# Patient Record
Sex: MALE
[De-identification: names, ages, dates, MRNs, and addresses within clinical notes are randomized per-mention and may not be internally consistent; named-entity substitution may affect disease eponyms.]

---

## 2020-10-06 PROBLEM — Z00.00 ENCOUNTER FOR PREVENTIVE HEALTH EXAMINATION: Noted: 2020-10-06

## 2020-10-07 ENCOUNTER — APPOINTMENT (OUTPATIENT)
Dept: UROLOGY | Facility: CLINIC | Age: 25
End: 2020-10-07
Payer: MEDICAID

## 2020-10-07 VITALS
WEIGHT: 165 LBS | HEART RATE: 88 BPM | BODY MASS INDEX: 24.44 KG/M2 | HEIGHT: 69 IN | DIASTOLIC BLOOD PRESSURE: 76 MMHG | SYSTOLIC BLOOD PRESSURE: 115 MMHG | RESPIRATION RATE: 14 BRPM | TEMPERATURE: 98.2 F

## 2020-10-07 PROCEDURE — 99203 OFFICE O/P NEW LOW 30 MIN: CPT

## 2020-10-07 RX ORDER — CHLORHEXIDINE GLUCONATE 4 %
LIQUID (ML) TOPICAL
Refills: 0 | Status: ACTIVE | COMMUNITY
Start: 2020-10-07

## 2020-10-07 NOTE — HISTORY OF PRESENT ILLNESS
[Currently Experiencing ___] :  [unfilled] [None] : None [FreeTextEntry1] : With c/o gradually worsening  ED x 1 year, with difficulty obtaining and maintaining erections. States no spontaneous erections and when attempting sexual activity loses erection.  In a relationship, libido good. Has tried Sildenafil 25 mg prescribed by PCP, used last Wednesday, not effective. Also has been using  Sildenafil 45 mg, 2 chew tabs (bought offline "[employer]"), initially effective, last time used was 3 weeks ago, no effect. Was taking [employer] supplement Vitamin Vitality pack ( no Testosterone noted on label).\par

## 2020-10-07 NOTE — PHYSICAL EXAM
[General Appearance - Well Developed] : well developed [General Appearance - Well Nourished] : well nourished [Normal Appearance] : normal appearance [Well Groomed] : well groomed [General Appearance - In No Acute Distress] : no acute distress [Edema] : no peripheral edema [] : no respiratory distress [Respiration, Rhythm And Depth] : normal respiratory rhythm and effort [Exaggerated Use Of Accessory Muscles For Inspiration] : no accessory muscle use [Abdomen Soft] : soft [Abdomen Tenderness] : non-tender [Urethral Meatus] : meatus normal [Penis Abnormality] : normal uncircumcised penis [Scrotum] : the scrotum was normal [Epididymis] : the epididymides were normal [Testes Tenderness] : no tenderness of the testes [Testes Mass (___cm)] : there were no testicular masses [Normal Station and Gait] : the gait and station were normal for the patient's age [Skin Color & Pigmentation] : normal skin color and pigmentation [No Focal Deficits] : no focal deficits [Oriented To Time, Place, And Person] : oriented to person, place, and time [Affect] : the affect was normal [Mood] : the mood was normal [Not Anxious] : not anxious

## 2020-10-07 NOTE — REVIEW OF SYSTEMS
[Negative] : Heme/Lymph [see HPI] : see HPI [Poor quality erections] : Poor quality erections [No erections] : no erections

## 2020-10-09 LAB
CHOLEST SERPL-MCNC: 149 MG/DL
CHOLEST/HDLC SERPL: 3.6 RATIO
HDLC SERPL-MCNC: 42 MG/DL
LDLC SERPL CALC-MCNC: 94 MG/DL
TRIGL SERPL-MCNC: 67 MG/DL

## 2020-10-12 ENCOUNTER — TRANSCRIPTION ENCOUNTER (OUTPATIENT)
Age: 25
End: 2020-10-12

## 2020-10-15 LAB
TESTOST BND SERPL-MCNC: 18.5 PG/ML
TESTOST SERPL-MCNC: 485.5 NG/DL

## 2020-10-29 ENCOUNTER — APPOINTMENT (OUTPATIENT)
Age: 25
End: 2020-10-29
Payer: MEDICAID

## 2020-10-29 VITALS
RESPIRATION RATE: 17 BRPM | HEART RATE: 98 BPM | DIASTOLIC BLOOD PRESSURE: 73 MMHG | SYSTOLIC BLOOD PRESSURE: 118 MMHG | TEMPERATURE: 98 F

## 2020-10-29 VITALS — TEMPERATURE: 98 F

## 2020-10-29 PROCEDURE — 99215 OFFICE O/P EST HI 40 MIN: CPT

## 2020-10-29 PROCEDURE — 99072 ADDL SUPL MATRL&STAF TM PHE: CPT

## 2020-10-29 NOTE — HISTORY OF PRESENT ILLNESS
[FreeTextEntry1] : Patient is a 25-year-old gentleman who presents with a chief complaint of erectile dysfunction. He states that this has been present for the past 3 years. It causes both he and his partner great stress.  I reviewed the questionnaire he completed in detail.  Patient states he no longer gets morning erections and has difficulty obtaining and maintaining an erection with and without use of 50 mg of sildenafil.   He states that his erections presently are often less than 0 out of 10 in both tumescence and rigidity, insufficient for penetration.   He often ejaculates through a flaccid phallus.  He has difficulty maintaining an erection. He describes a normal libido.  His sexual dysfunction occurs with both sexual relations and masturbation.  His erections are not improved with PDE5 inhibitors.    His partner is understanding and was unable to be with him at the visit today. He is not .\par \par The patient denies fevers, chills, nausea and/or vomiting and no unexplained weight loss.  His past medical history is non-contributory.  In his present occupation he has no known toxin exposure. He does not smoke and drinks socially.  He has no known drug allergies. His review of systems and past medical history demonstrates no significant urologic issues (see patient completed questionnaire). His family history demonstrates no significant urologic issues. He has an AUA symptom score of. He has a sexual function symptom score of (normal is 60-75).

## 2020-10-29 NOTE — ASSESSMENT
[FreeTextEntry1] : This pleasant 25 gentleman presents for evaluation of his sexual dysfunction.  I have requested several blood studies.  Urine dip and microscopic analysis [was requested].  I will make further recommendations when the results return. I have suggested a diagnostic injection and duplex ultrasound to evaluate his penile vasculature. We discussed his evaluation today and possible options for therapy depending upon the results of his testing.  I will be better able to make more specific recommendations after additional test results return. \par 1. Review blood tests on follow up or when they return\par 2. Penile duplex study on follow up\par \par Consultation: 40 minutes:  20 minutes reviewing his history and performing a physical examination.  20 minutes reviewing the ultrasound, writing for  blood studies ,discussing treatment options and writing his note. There was also additional time in preparation for today's visit.\par

## 2020-10-29 NOTE — PHYSICAL EXAM
[General Appearance - Well Developed] : well developed [General Appearance - Well Nourished] : well nourished [Normal Appearance] : normal appearance [Well Groomed] : well groomed [General Appearance - In No Acute Distress] : no acute distress [Heart Rate And Rhythm] : Heart rate and rhythm were normal [Arterial Pulses Normal] : the pedal pulses were normal [Edema] : no peripheral edema [Respiration, Rhythm And Depth] : normal respiratory rhythm and effort [Exaggerated Use Of Accessory Muscles For Inspiration] : no accessory muscle use [Auscultation Breath Sounds / Voice Sounds] : lungs were clear to auscultation bilaterally [Chest Palpation] : palpation of the chest revealed no abnormalities [Lungs Percussion] : the lungs were normal to percussion [Bowel Sounds] : normal bowel sounds [Abdomen Soft] : soft [Abdomen Tenderness] : non-tender [Abdomen Mass (___ Cm)] : no abdominal mass palpated [Abdomen Hernia] : no hernia was discovered [Costovertebral Angle Tenderness] : no ~M costovertebral angle tenderness [Urethral Meatus] : meatus normal [Urinary Bladder Findings] : the bladder was normal on palpation [Scrotum] : the scrotum was normal [Epididymis] : the epididymides were normal [Testes Tenderness] : no tenderness of the testes [Testes Mass (___cm)] : there were no testicular masses [Anus Abnormality] : the anus and perineum were normal [No Prostate Nodules] : no prostate nodules [Normal Station and Gait] : the gait and station were normal for the patient's age [Skin Color & Pigmentation] : normal skin color and pigmentation [Skin Turgor] : supple [] : no rash [Skin Lesions] : no skin lesions [No Focal Deficits] : no focal deficits [Sensation] : the sensory exam was normal to light touch and pinprick [Motor Exam] : the motor exam was normal [Oriented To Time, Place, And Person] : oriented to person, place, and time [Affect] : the affect was normal [Mood] : the mood was normal [Not Anxious] : not anxious [No Palpable Adenopathy] : no palpable adenopathy [Cervical Lymph Nodes Enlarged Posterior Bilaterally] : posterior cervical [Cervical Lymph Nodes Enlarged Anterior Bilaterally] : anterior cervical [Supraclavicular Lymph Nodes Enlarged Bilaterally] : supraclavicular [Axillary Lymph Nodes Enlarged Bilaterally] : axillary [Femoral Lymph Nodes Enlarged Bilaterally] : femoral [Inguinal Lymph Nodes Enlarged Bilaterally] : inguinal

## 2020-11-02 LAB
25(OH)D3 SERPL-MCNC: 28.8 NG/ML
ALBUMIN SERPL ELPH-MCNC: 5.3 G/DL
ALP BLD-CCNC: 80 U/L
ALT SERPL-CCNC: 39 U/L
ANION GAP SERPL CALC-SCNC: 14 MMOL/L
AST SERPL-CCNC: 24 U/L
BASOPHILS # BLD AUTO: 0.01 K/UL
BASOPHILS NFR BLD AUTO: 0.2 %
BILIRUB SERPL-MCNC: 1 MG/DL
BUN SERPL-MCNC: 9 MG/DL
CALCIUM SERPL-MCNC: 10.1 MG/DL
CHLORIDE SERPL-SCNC: 100 MMOL/L
CHOLEST SERPL-MCNC: 160 MG/DL
CO2 SERPL-SCNC: 26 MMOL/L
CREAT SERPL-MCNC: 0.77 MG/DL
EOSINOPHIL # BLD AUTO: 0.08 K/UL
EOSINOPHIL NFR BLD AUTO: 1.4 %
ESTIMATED AVERAGE GLUCOSE: 97 MG/DL
ESTRADIOL SERPL-MCNC: 34 PG/ML
FSH SERPL-MCNC: 1.4 IU/L
GLUCOSE SERPL-MCNC: 100 MG/DL
HBA1C MFR BLD HPLC: 5 %
HCT VFR BLD CALC: 51.2 %
HDLC SERPL-MCNC: 42 MG/DL
HGB BLD-MCNC: 17 G/DL
IMM GRANULOCYTES NFR BLD AUTO: 0.4 %
LDLC SERPL CALC-MCNC: 103 MG/DL
LH SERPL-ACNC: 4.2 IU/L
LYMPHOCYTES # BLD AUTO: 1.77 K/UL
LYMPHOCYTES NFR BLD AUTO: 31.4 %
MAN DIFF?: NORMAL
MCHC RBC-ENTMCNC: 28.9 PG
MCHC RBC-ENTMCNC: 33.2 GM/DL
MCV RBC AUTO: 87.1 FL
MONOCYTES # BLD AUTO: 0.49 K/UL
MONOCYTES NFR BLD AUTO: 8.7 %
NEUTROPHILS # BLD AUTO: 3.26 K/UL
NEUTROPHILS NFR BLD AUTO: 57.9 %
NONHDLC SERPL-MCNC: 117 MG/DL
PLATELET # BLD AUTO: 255 K/UL
POTASSIUM SERPL-SCNC: 3.9 MMOL/L
PROT SERPL-MCNC: 7.9 G/DL
RBC # BLD: 5.88 M/UL
RBC # FLD: 12.8 %
SODIUM SERPL-SCNC: 140 MMOL/L
TRIGL SERPL-MCNC: 74 MG/DL
TSH SERPL-ACNC: 1.39 UIU/ML
WBC # FLD AUTO: 5.63 K/UL

## 2020-11-05 ENCOUNTER — OUTPATIENT (OUTPATIENT)
Dept: OUTPATIENT SERVICES | Facility: HOSPITAL | Age: 25
LOS: 1 days | End: 2020-11-05
Payer: MEDICAID

## 2020-11-05 ENCOUNTER — APPOINTMENT (OUTPATIENT)
Dept: UROLOGY | Facility: CLINIC | Age: 25
End: 2020-11-05
Payer: MEDICAID

## 2020-11-05 DIAGNOSIS — R35.0 FREQUENCY OF MICTURITION: ICD-10-CM

## 2020-11-05 PROCEDURE — 54235 NJX CORPORA CAVERNOSA RX AGT: CPT

## 2020-11-05 PROCEDURE — 99214 OFFICE O/P EST MOD 30 MIN: CPT | Mod: 25

## 2020-11-05 PROCEDURE — 93980 PENILE VASCULAR STUDY: CPT

## 2020-11-05 PROCEDURE — 93980 PENILE VASCULAR STUDY: CPT | Mod: 26

## 2020-11-05 PROCEDURE — 99072 ADDL SUPL MATRL&STAF TM PHE: CPT

## 2020-11-05 NOTE — HISTORY OF PRESENT ILLNESS
[FreeTextEntry1] : The patient returns for follow-up to review his recent blood studies and to discuss options for therapy.  \par \par PMH: Patient initially presented with a chief complaint of erectile dysfunction. He states that this has been present for the past 3 years. It causes both he and his partner great stress.  Patient states he no longer gets morning erections and has difficulty obtaining and maintaining an erection with and without use of 50 mg of sildenafil.   He states that his erections presently are often less than 0 out of 10 in both tumescence and rigidity, insufficient for penetration.   He often ejaculates through a flaccid phallus.  He has difficulty maintaining an erection. He describes a normal libido.  His sexual dysfunction occurs with both sexual relations and masturbation.  His erections are not improved with PDE5 inhibitors.    His partner is understanding and was unable to be with him at the visit today. He is not .\par \par The patient denies fevers, chills, nausea and/or vomiting and no unexplained weight loss.  His past medical history is non-contributory.  In his present occupation he has no known toxin exposure. He does not smoke and drinks socially.  He has no known drug allergies. His review of systems and past medical history demonstrates no significant urologic issues (see patient completed questionnaire). His family history demonstrates no significant urologic issues. He has an AUA symptom score of. He has a sexual function symptom score of (normal is 60-75).

## 2020-11-05 NOTE — ASSESSMENT
[FreeTextEntry1] : The patient returns for follow-up to review his recent blood studies and to discuss options for therapy.  Blood studies demonstrated a low LH of 1.5 ng/mL and a normal testosterone.\par \par Duplex ultrasound study demonstrated minimal venous genic dysfunction.  He states that sildenafil at 50 mg is not working well for him and he has tried higher dose without much improvement.  I will have him back for injection training at 0.05 to 0.1 cc of the Trimix.\par \par Consultation: 30 minutes:  10 minutes reviewing his history and performing a physical examination.  20 minutes reviewing his blood studies and the ultrasound,discussing treatment options and writing his note. There was also additional time in preparation for today's visit.\par

## 2020-11-10 DIAGNOSIS — N52.9 MALE ERECTILE DYSFUNCTION, UNSPECIFIED: ICD-10-CM

## 2020-11-11 LAB
TESTOST BND SERPL-MCNC: 20.6 PG/ML
TESTOST SERPL-MCNC: 552.8 NG/DL

## 2020-11-17 ENCOUNTER — APPOINTMENT (OUTPATIENT)
Dept: UROLOGY | Facility: CLINIC | Age: 25
End: 2020-11-17
Payer: MEDICAID

## 2020-11-17 PROCEDURE — 99214 OFFICE O/P EST MOD 30 MIN: CPT

## 2020-11-17 RX ORDER — PAPAVERINE HYDROCHLORIDE 30 MG/ML
30 INJECTION, SOLUTION INTRAVENOUS
Qty: 2 | Refills: 0 | Status: ACTIVE | COMMUNITY
Start: 2020-10-29 | End: 1900-01-01

## 2020-11-17 NOTE — ASSESSMENT
[FreeTextEntry1] : Patient returned for his first penile injection training.  He stated that he has not been able to obtain an erection sufficient for penetration with PDE5 inhibitors.  He was not interested in using a vacuum constriction device at this time. He wanted to try penile injection therapy.\par \par We reviewed the procedure with the relative risks and benefits. A copy of the informed consent is on file. I assisted him in injecting 0.1 cc of the TriMix into the right corpora. At 30 minutes he had 80% tumescence and 60% rigidity. His erection dissipated prior to his leaving the office.  We also reviewed use of PDE 5 inhibitors.  He has tried them in the past which limited success.  He also felt they took too long to be effective.  \par \par We discussed the use of a pharmacologic agent in the diagnostic evaluation of organic and in special instances psychogenic erectile dysfunction.  He was made fully aware that several medications used may not be approved by the [employer] for this purpose, although they may be well recognized and accepted by the American [employer] Association as a treatment and diagnostic tool for impotence.  He understands that there still remains a need to standardize the indications, contraindications and dosage parameters for the testing as well as treatment purposes of this procedure. He understands that the TriMix is a compounded medication and that there are other, FDA approved, injectable medications available for  use. \par \par He received a brochure on injection therapy and I have taken particular care in reviewing carefully all potential complications of this procedure and made him  fully aware that even a death has been reported in conjunction with this therapy.  Never-the-less, weighing all risks and benefits that this procedure affords, he was willing to proceed with the use of a intracavernosal injection of the pharmacological agent prescribed and either compounded by a pharmaceutical company, pharmacist or by Dr. Beltran as part of the diagnostic evaluation and/or use of this pharmacologic agent as a treatment for  erectile dysfunction. He was made aware that a prolonged erection (priapism) might result from penile injections and that it must be treated within four hours to prevent damage to the erectile mechanism of his penis. He acknowledged that he must notify Dr. Beltran (or the covering doctor) and have this condition treated within fours hours should it occur. He had the opportunity to ask any questions all of which were answered to his satisfaction.  He will be injecting 0.1 to 0.2 cc of the trimix.\par \par He will call with any concerns or questions. I will see him back in follow up prior to dispensing additional medication. I will speak with him in 4 to 6 weeks to see how the trimix is working. We also discussed used of tadalafil. He prefers trimix.

## 2020-11-17 NOTE — HISTORY OF PRESENT ILLNESS
[FreeTextEntry1] : The patient returns for penile injection training.\par \par PMH: Patient initially presented with a chief complaint of erectile dysfunction. He states that this has been present for the past 3 years. It causes both he and his partner great stress.  Patient states he no longer gets morning erections and has difficulty obtaining and maintaining an erection with and without use of 50 mg of sildenafil.   He states that his erections presently are often less than 0 out of 10 in both tumescence and rigidity, insufficient for penetration.   He often ejaculates through a flaccid phallus.  He has difficulty maintaining an erection. He describes a normal libido.  His sexual dysfunction occurs with both sexual relations and masturbation.  His erections are not improved with PDE5 inhibitors.    His partner is understanding and was unable to be with him at the visit today. He is not .\par \par The patient denies fevers, chills, nausea and/or vomiting and no unexplained weight loss.  His past medical history is non-contributory.  In his present occupation he has no known toxin exposure. He does not smoke and drinks socially.  He has no known drug allergies. His review of systems and past medical history demonstrates no significant urologic issues (see patient completed questionnaire). His family history demonstrates no significant urologic issues. He has an AUA symptom score of. He has a sexual function symptom score of (normal is 60-75).

## 2020-12-16 ENCOUNTER — APPOINTMENT (OUTPATIENT)
Dept: UROLOGY | Facility: CLINIC | Age: 25
End: 2020-12-16
Payer: MEDICAID

## 2020-12-16 DIAGNOSIS — N52.9 MALE ERECTILE DYSFUNCTION, UNSPECIFIED: ICD-10-CM

## 2020-12-16 PROCEDURE — 99214 OFFICE O/P EST MOD 30 MIN: CPT | Mod: 95

## 2020-12-16 NOTE — HISTORY OF PRESENT ILLNESS
[Home] : at home, [unfilled] , at the time of the visit. [Medical Office: ([facility])___] : at the medical office located in  [Verbal consent obtained from patient] : the patient, [unfilled] [FreeTextEntry1] : The patient-doctor. relationship has been established in a face-to-face fashion on real-time video audio HIPAA compliant communication using telemedicine software.  The patient's identity has been confirmed.  The patient was previously emailed a copy of the telemedicine consent.  The patient has had a chance to review and has now given verbal consent and has requested care to be assessed and treated through telemedicine. The patient understands there may be limitations in this process and that they need not need further follow-up care in the office and/or hospital settings. We were unable to use the [employer] platform and an alternative platform was utilized.\par \par Verbal consent was given on Wednesday, December 16, 2020 at 11 AM by the patient.  It was requested by the physician.  A written consent was previously sent for the patient to sign and return.\par \par The patient returns for telehealth consultation.  He is not happy with injecting the Trimix.  He would like to discuss other options.  He has used sildenafil in the past with fair efficacy.\par \par PMH: Patient initially presented with a chief complaint of erectile dysfunction. He states that this has been present for the past 3 years. It causes both he and his partner great stress.  Patient states he no longer gets morning erections and has difficulty obtaining and maintaining an erection with and without use of 50 mg of sildenafil.   He states that his erections presently are often less than 0 out of 10 in both tumescence and rigidity, insufficient for penetration.   He often ejaculates through a flaccid phallus.  He has difficulty maintaining an erection. He describes a normal libido.  His sexual dysfunction occurs with both sexual relations and masturbation.  His erections are not improved with PDE5 inhibitors.    His partner is understanding and was unable to be with him at the visit today. He is not .\par \par The patient denies fevers, chills, nausea and/or vomiting and no unexplained weight loss.  His past medical history is non-contributory.  In his present occupation he has no known toxin exposure. He does not smoke and drinks socially.  He has no known drug allergies. His review of systems and past medical history demonstrates no significant urologic issues (see patient completed questionnaire). His family history demonstrates no significant urologic issues. He has an AUA symptom score of. He has a sexual function symptom score of (normal is 60-75).

## 2020-12-16 NOTE — ASSESSMENT
[FreeTextEntry1] : The patient returns for telehealth consultation.  He is not happy with injecting the Trimix.  He would like to discuss other options.  He has used sildenafil in the past with fair efficacy.\par \par We discussed several options including sildenafil and tadalafil.  I explained the relative risks and benefits of each.  He would like to try tadalafil at 20 mg since he was using 100 mg of sildenafil.  I reviewed with him the proper usage of this medication.  I will see him back for a telehealth consultation in 4 weeks to see the efficacy.  At his request we also discussed his evaluation to date in particular the penile duplex ultrasound study.\par \par Telehealth Consultation: 30 minutes  10 minutes reviewing his history and discussing prior results.  20 minutes discussing various treatment options, writing medication prescriptions and writing his note. There was also additional time in preparing for the visit and assisting the patient with technology issues he was having with the telehealth platform.

## 2021-06-02 ENCOUNTER — APPOINTMENT (OUTPATIENT)
Dept: UROLOGY | Facility: CLINIC | Age: 26
End: 2021-06-02
Payer: MEDICAID

## 2021-06-02 DIAGNOSIS — N52.9 MALE ERECTILE DYSFUNCTION, UNSPECIFIED: ICD-10-CM

## 2021-06-02 PROCEDURE — 99214 OFFICE O/P EST MOD 30 MIN: CPT | Mod: 95

## 2021-06-02 RX ORDER — TADALAFIL 20 MG/1
20 TABLET ORAL
Qty: 12 | Refills: 1 | Status: ACTIVE | COMMUNITY
Start: 2020-12-16 | End: 1900-01-01

## 2021-06-02 NOTE — ASSESSMENT
[FreeTextEntry1] : The patient returns for a telehealth consultation.  He has been using tadalafil.  It has worked very well for him and he is happy to continue with it.  He states that his erections are better at times and can have relations without any medication to.  We again discussed the proper use of tadalafil with the relative risks and benefits.  He will continue to use it.  I renewed his tadalafil prescription.  It was sent to [facility] at his request.  I will speak with him by telehealth in 3 to 6 months for follow-up.\par \par Telehealth Consultation: 30 minutes  10 minutes reviewing his history and discussing prior results.  20 minutes discussing various treatment options, writing medication prescriptions and writing his note. There was also additional time in preparing for the visit and assisting the patient with technology issues he was having with the telehealth platform.

## 2021-06-02 NOTE — HISTORY OF PRESENT ILLNESS
[Home] : at home, [unfilled] , at the time of the visit. [Medical Office: ([facility])___] : at the medical office located in  [Verbal consent obtained from patient] : the patient, [unfilled] [FreeTextEntry1] : The patient-doctor. relationship has been established in a face-to-face fashion on real-time video audio HIPAA compliant communication using telemedicine software.  The patient's identity has been confirmed.  The patient was previously emailed a copy of the telemedicine consent.  The patient has had a chance to review and has now given verbal consent and has requested care to be assessed and treated through telemedicine. The patient understands there may be limitations in this process and that they need not need further follow-up care in the office and/or hospital settings. We were unable to use the [employer] platform and an alternative platform was utilized.\par \par Verbal consent was given on Wednesday, December 16, 2020 at 11 AM by the patient.  It was requested by the physician.  A written consent was previously sent for the patient to sign and return.\par \par The patient returns for a telehealth consultation.  He has been using tadalafil.  It has worked very well for him and he is happy to continue with it.  He states that his erections are better at times and can have relations without any medication to.\par \par PMH: Patient initially presented with a chief complaint of erectile dysfunction. He states that this has been present for the past 3 years. It causes both he and his partner great stress.  Patient states he no longer gets morning erections and has difficulty obtaining and maintaining an erection with and without use of 50 mg of sildenafil.   He states that his erections presently are often less than 0 out of 10 in both tumescence and rigidity, insufficient for penetration.   He often ejaculates through a flaccid phallus.  He has difficulty maintaining an erection. He describes a normal libido.  His sexual dysfunction occurs with both sexual relations and masturbation.  His erections are not improved with PDE5 inhibitors.    His partner is understanding and was unable to be with him at the visit today. He is not .\par \par The patient denies fevers, chills, nausea and/or vomiting and no unexplained weight loss.  His past medical history is non-contributory.  In his present occupation he has no known toxin exposure. He does not smoke and drinks socially.  He has no known drug allergies. His review of systems and past medical history demonstrates no significant urologic issues (see patient completed questionnaire). His family history demonstrates no significant urologic issues. He has an AUA symptom score of. He has a sexual function symptom score of (normal is 60-75).

## 2023-11-30 NOTE — ASSESSMENT
EKG was sent to scanning instead of scanned into the actual order. EKG has now been scanned into the order.   [FreeTextEntry1] : ED\par \par Lab: Testosterone, Lipid panel\par Can call for results, if negative findings will refer to Dr. Beltran

## 2024-12-17 ENCOUNTER — APPOINTMENT (OUTPATIENT)
Dept: UROLOGY | Facility: CLINIC | Age: 29
End: 2024-12-17
Payer: MEDICAID

## 2024-12-17 ENCOUNTER — NON-APPOINTMENT (OUTPATIENT)
Age: 29
End: 2024-12-17

## 2024-12-17 VITALS
DIASTOLIC BLOOD PRESSURE: 70 MMHG | HEART RATE: 90 BPM | SYSTOLIC BLOOD PRESSURE: 107 MMHG | HEIGHT: 69 IN | WEIGHT: 175 LBS | OXYGEN SATURATION: 98 % | RESPIRATION RATE: 16 BRPM | BODY MASS INDEX: 25.92 KG/M2

## 2024-12-17 DIAGNOSIS — Z78.9 OTHER SPECIFIED HEALTH STATUS: ICD-10-CM

## 2024-12-17 DIAGNOSIS — R30.0 DYSURIA: ICD-10-CM

## 2024-12-17 DIAGNOSIS — R39.89 OTHER SYMPTOMS AND SIGNS INVOLVING THE GENITOURINARY SYSTEM: ICD-10-CM

## 2024-12-17 LAB
APPEARANCE: CLEAR
BILIRUBIN URINE: NEGATIVE
BLOOD URINE: NEGATIVE
COLOR: YELLOW
GLUCOSE QUALITATIVE U: NEGATIVE
KETONES URINE: NEGATIVE
LEUKOCYTE ESTERASE URINE: NEGATIVE
NITRITE URINE: NEGATIVE
PH URINE: 6
PROTEIN URINE: NEGATIVE
SPECIFIC GRAVITY URINE: 1.02
UROBILINOGEN URINE: 0.2 (ref 0.2–?)

## 2024-12-17 PROCEDURE — 99203 OFFICE O/P NEW LOW 30 MIN: CPT

## 2024-12-17 RX ORDER — DOXYCYCLINE 100 MG/1
100 CAPSULE ORAL TWICE DAILY
Qty: 14 | Refills: 0 | Status: DISCONTINUED | COMMUNITY
Start: 2024-12-17 | End: 2024-12-17

## 2024-12-17 RX ORDER — DOXYCYCLINE HYCLATE 100 MG/1
100 TABLET ORAL
Qty: 14 | Refills: 0 | Status: ACTIVE | COMMUNITY
Start: 2024-12-17 | End: 1900-01-01

## 2024-12-18 ENCOUNTER — EMERGENCY (EMERGENCY)
Facility: HOSPITAL | Age: 29
LOS: 1 days | Discharge: HOME | End: 2024-12-18
Attending: EMERGENCY MEDICINE
Payer: COMMERCIAL

## 2024-12-18 VITALS
RESPIRATION RATE: 20 BRPM | WEIGHT: 157.19 LBS | DIASTOLIC BLOOD PRESSURE: 79 MMHG | OXYGEN SATURATION: 98 % | HEIGHT: 69 IN | SYSTOLIC BLOOD PRESSURE: 119 MMHG | TEMPERATURE: 100 F | HEART RATE: 115 BPM

## 2024-12-18 LAB
ALBUMIN SERPL ELPH-MCNC: 4.7 G/DL — SIGNIFICANT CHANGE UP (ref 3.3–5.2)
ALP SERPL-CCNC: 67 U/L — SIGNIFICANT CHANGE UP (ref 40–120)
ALT FLD-CCNC: 36 U/L — SIGNIFICANT CHANGE UP
ANION GAP SERPL CALC-SCNC: 14 MMOL/L — SIGNIFICANT CHANGE UP (ref 5–17)
APPEARANCE UR: CLEAR — SIGNIFICANT CHANGE UP
AST SERPL-CCNC: 33 U/L — SIGNIFICANT CHANGE UP
BASOPHILS # BLD AUTO: 0 K/UL — SIGNIFICANT CHANGE UP (ref 0–0.2)
BASOPHILS NFR BLD AUTO: 0 % — SIGNIFICANT CHANGE UP (ref 0–2)
BILIRUB SERPL-MCNC: 0.9 MG/DL — SIGNIFICANT CHANGE UP (ref 0.4–2)
BILIRUB UR-MCNC: NEGATIVE — SIGNIFICANT CHANGE UP
BUN SERPL-MCNC: 8.8 MG/DL — SIGNIFICANT CHANGE UP (ref 8–20)
CALCIUM SERPL-MCNC: 9.5 MG/DL — SIGNIFICANT CHANGE UP (ref 8.4–10.5)
CHLORIDE SERPL-SCNC: 96 MMOL/L — SIGNIFICANT CHANGE UP (ref 96–108)
CO2 SERPL-SCNC: 26 MMOL/L — SIGNIFICANT CHANGE UP (ref 22–29)
COLOR SPEC: YELLOW — SIGNIFICANT CHANGE UP
CREAT SERPL-MCNC: 0.8 MG/DL — SIGNIFICANT CHANGE UP (ref 0.5–1.3)
DIFF PNL FLD: NEGATIVE — SIGNIFICANT CHANGE UP
EGFR: 123 ML/MIN/1.73M2 — SIGNIFICANT CHANGE UP
EOSINOPHIL # BLD AUTO: 0 K/UL — SIGNIFICANT CHANGE UP (ref 0–0.5)
EOSINOPHIL NFR BLD AUTO: 0 % — SIGNIFICANT CHANGE UP (ref 0–6)
GLUCOSE SERPL-MCNC: 102 MG/DL — HIGH (ref 70–99)
GLUCOSE UR QL: NEGATIVE MG/DL — SIGNIFICANT CHANGE UP
HCT VFR BLD CALC: 48.5 % — SIGNIFICANT CHANGE UP (ref 39–50)
HGB BLD-MCNC: 16.7 G/DL — SIGNIFICANT CHANGE UP (ref 13–17)
KETONES UR-MCNC: NEGATIVE MG/DL — SIGNIFICANT CHANGE UP
LEUKOCYTE ESTERASE UR-ACNC: NEGATIVE — SIGNIFICANT CHANGE UP
LIDOCAIN IGE QN: 25 U/L — SIGNIFICANT CHANGE UP (ref 22–51)
LYMPHOCYTES # BLD AUTO: 1.17 K/UL — SIGNIFICANT CHANGE UP (ref 1–3.3)
LYMPHOCYTES # BLD AUTO: 13.4 % — SIGNIFICANT CHANGE UP (ref 13–44)
MANUAL SMEAR VERIFICATION: SIGNIFICANT CHANGE UP
MCHC RBC-ENTMCNC: 27.6 PG — SIGNIFICANT CHANGE UP (ref 27–34)
MCHC RBC-ENTMCNC: 34.4 G/DL — SIGNIFICANT CHANGE UP (ref 32–36)
MCV RBC AUTO: 80.3 FL — SIGNIFICANT CHANGE UP (ref 80–100)
MONOCYTES # BLD AUTO: 1.17 K/UL — HIGH (ref 0–0.9)
MONOCYTES NFR BLD AUTO: 13.4 % — SIGNIFICANT CHANGE UP (ref 2–14)
NEUTROPHILS # BLD AUTO: 6.06 K/UL — SIGNIFICANT CHANGE UP (ref 1.8–7.4)
NEUTROPHILS NFR BLD AUTO: 69.6 % — SIGNIFICANT CHANGE UP (ref 43–77)
NITRITE UR-MCNC: NEGATIVE — SIGNIFICANT CHANGE UP
PH UR: 7 — SIGNIFICANT CHANGE UP (ref 5–8)
PLAT MORPH BLD: NORMAL — SIGNIFICANT CHANGE UP
PLATELET # BLD AUTO: 179 K/UL — SIGNIFICANT CHANGE UP (ref 150–400)
POTASSIUM SERPL-MCNC: 3.9 MMOL/L — SIGNIFICANT CHANGE UP (ref 3.5–5.3)
POTASSIUM SERPL-SCNC: 3.9 MMOL/L — SIGNIFICANT CHANGE UP (ref 3.5–5.3)
PROT SERPL-MCNC: 8.1 G/DL — SIGNIFICANT CHANGE UP (ref 6.6–8.7)
PROT UR-MCNC: NEGATIVE MG/DL — SIGNIFICANT CHANGE UP
RBC # BLD: 6.04 M/UL — HIGH (ref 4.2–5.8)
RBC # FLD: 13.3 % — SIGNIFICANT CHANGE UP (ref 10.3–14.5)
RBC BLD AUTO: NORMAL — SIGNIFICANT CHANGE UP
S PYO DNA THROAT QL NAA+PROBE: SIGNIFICANT CHANGE UP
SMUDGE CELLS # BLD: PRESENT — SIGNIFICANT CHANGE UP
SODIUM SERPL-SCNC: 136 MMOL/L — SIGNIFICANT CHANGE UP (ref 135–145)
SP GR SPEC: 1.01 — SIGNIFICANT CHANGE UP (ref 1–1.03)
UROBILINOGEN FLD QL: 1 MG/DL — SIGNIFICANT CHANGE UP (ref 0.2–1)
VARIANT LYMPHS # BLD: 3.6 % — SIGNIFICANT CHANGE UP (ref 0–6)
WBC # BLD: 8.71 K/UL — SIGNIFICANT CHANGE UP (ref 3.8–10.5)
WBC # FLD AUTO: 8.71 K/UL — SIGNIFICANT CHANGE UP (ref 3.8–10.5)

## 2024-12-18 PROCEDURE — 87798 DETECT AGENT NOS DNA AMP: CPT

## 2024-12-18 PROCEDURE — 96374 THER/PROPH/DIAG INJ IV PUSH: CPT

## 2024-12-18 PROCEDURE — 87651 STREP A DNA AMP PROBE: CPT

## 2024-12-18 PROCEDURE — 83690 ASSAY OF LIPASE: CPT

## 2024-12-18 PROCEDURE — 81003 URINALYSIS AUTO W/O SCOPE: CPT

## 2024-12-18 PROCEDURE — 36415 COLL VENOUS BLD VENIPUNCTURE: CPT

## 2024-12-18 PROCEDURE — 99284 EMERGENCY DEPT VISIT MOD MDM: CPT | Mod: 25

## 2024-12-18 PROCEDURE — 87086 URINE CULTURE/COLONY COUNT: CPT

## 2024-12-18 PROCEDURE — 80053 COMPREHEN METABOLIC PANEL: CPT

## 2024-12-18 PROCEDURE — 85025 COMPLETE CBC W/AUTO DIFF WBC: CPT

## 2024-12-18 PROCEDURE — 99284 EMERGENCY DEPT VISIT MOD MDM: CPT

## 2024-12-18 RX ORDER — SODIUM CHLORIDE 9 MG/ML
1000 INJECTION, SOLUTION INTRAMUSCULAR; INTRAVENOUS; SUBCUTANEOUS ONCE
Refills: 0 | Status: COMPLETED | OUTPATIENT
Start: 2024-12-18 | End: 2024-12-18

## 2024-12-18 RX ORDER — KETOROLAC TROMETHAMINE 30 MG/ML
30 INJECTION INTRAMUSCULAR; INTRAVENOUS ONCE
Refills: 0 | Status: DISCONTINUED | OUTPATIENT
Start: 2024-12-18 | End: 2024-12-18

## 2024-12-18 RX ADMIN — SODIUM CHLORIDE 1000 MILLILITER(S): 9 INJECTION, SOLUTION INTRAMUSCULAR; INTRAVENOUS; SUBCUTANEOUS at 17:23

## 2024-12-18 RX ADMIN — KETOROLAC TROMETHAMINE 30 MILLIGRAM(S): 30 INJECTION INTRAMUSCULAR; INTRAVENOUS at 17:23

## 2024-12-18 NOTE — ED ADULT NURSE NOTE - OBJECTIVE STATEMENT
Pt is AxOx3, c/o burning with urination and fevers for the past few days. breathing even and unlabored. Pt is aware of plan of care.

## 2024-12-18 NOTE — ED PROVIDER NOTE - ATTENDING APP SHARED VISIT CONTRIBUTION OF CARE
30yo M no PMHx presents to ED c/o subjective fever, dysuria, body aches, throat pain, HA. Reports dysuria x 3 days as well as subjective fever, throat pain and body aches since yesterday. Pt was seen at urology office 2 days ago and had negative UA but was started on doxycycline due to symptoms. Pt has taken two doses of antibiotics so far. Oropharyngeal exam with inflamed tonsils but no exudates, strep pcr sent. UA negative at urology office yesterday. Suspect subjective fever and worsening of symptoms 2/2 viral pharyngitis. Plan to hydrate, check labs, ua and strep pcr  Labs unremarkable, no leukocytosis, strep pcr negative. UA negative. Pt feeling better after IVF and toradol. all results d/w pt. provided copy of all results. return precautions discussed.

## 2024-12-18 NOTE — ED PROVIDER NOTE - CLINICAL SUMMARY MEDICAL DECISION MAKING FREE TEXT BOX
30yo M no PMHx presents to ED c/o subjective fever, dysuria, body aches, throat pain, HA. Reports dysuria x 3 days as well as subjective fever, throat pain and body aches since yesterday. Pt was seen at urology office 2 days ago and had negative UA but was started on doxycycline due to symptoms. Pt has taken two doses of antibiotics so far. Oropharyngeal exam with inflamed tonsils but no exudates, strep pcr sent. UA negative at urology office yesterday. Suspect subjective fever and worsening of symptoms 2/2 tonsillitis. Plan to hydrate, check labs, ua and strep pcr 28yo M no PMHx presents to ED c/o subjective fever, dysuria, body aches, throat pain, HA. Reports dysuria x 3 days as well as subjective fever, throat pain and body aches since yesterday. Pt was seen at urology office 2 days ago and had negative UA but was started on doxycycline due to symptoms. Pt has taken two doses of antibiotics so far. Oropharyngeal exam with inflamed tonsils but no exudates, strep pcr sent. UA negative at urology office yesterday. Suspect subjective fever and worsening of symptoms 2/2 viral pharyngitis. Plan to hydrate, check labs, ua and strep pcr  Labs unremarkable, no leukocytosis, strep pcr negative. UA negative. Pt feeling better after IVF and toradol. all results d/w pt. provided copy of all results. return precautions discussed.

## 2024-12-18 NOTE — ED PROVIDER NOTE - OBJECTIVE STATEMENT
28yo M no PMHx presents to ED c/o subjective fever, dysuria, body aches, throat pain, HA. Reports dysuria x 3 days as well as subjective fever, throat pain and body aches since yesterday. Pt was seen at urology office 2 days ago and had negative UA but was started on doxycycline due to symptoms. Pt has taken two doses of antibiotics so far. Reports yesterday began having chills and body aches as well as throat pain. GC/chlamydia testing was performed at urology office but no results yet. Denies cough, nasal congestion, dizziness, cp, sob, testicular pain, urethral discharge, vomiting, diarrhea, recent travel, sick contacts.

## 2024-12-18 NOTE — ED PROVIDER NOTE - PATIENT PORTAL LINK FT
You can access the FollowMyHealth Patient Portal offered by [facility] by registering at the following website: [email or web address] By joining [employer] [facility]’s FollowMyHealth portal, you will also be able to view your health information using other applications (apps) compatible with our system.

## 2024-12-18 NOTE — ED PROVIDER NOTE - NSFOLLOWUPINSTRUCTIONS_ED_ALL_ED_FT
- Return to the ED for any new or worsening symptoms.   - Please continue taking Doxycycline as directed  - May take ibuprofen 600mg every 6 hours and/or tylenol 650mg every 6 hours as needed for pain and/or fevers  - Drink plenty of fluids to remain hydrated, i.e water, pedialyte, gatorade  - Wash hands frequently with warm soapy water    Pharyngitis    Pharyngitis is inflammation of your pharynx, which is typically caused by a viral or bacterial infection. Pharyngitis can be contagious and may spread from person to person through intimate contact, coughing, sneezing, or sharing personal items and utensils. Symptoms of pharyngitis may include sore throat, fever, headache, or swollen lymph nodes. If you are prescribed antibiotics, make sure you finish them even if you start to feel better. Gargle with salt water as often as every 1-2 hours to soothe your throat. Throat lozenges (if you are not at risk for choking) or sprays may be used to soothe your throat.    SEEK IMMEDIATE MEDICAL CARE IF YOU HAVE ANY OF THE FOLLOWING SYMPTOMS: neck stiffness, drooling, hoarseness or change in voice, inability to swallow liquids, vomiting, or trouble breathing.

## 2024-12-18 NOTE — ED PROVIDER NOTE - PHYSICAL EXAMINATION
Gen: No acute distress, non toxic  HENT: NCAT, Mucous membranes moist, +b/l tonsillar edema R>L without exudates, uvula midline. +small apthous ulcer left side inner mucosa lower lip  Eyes: pink conjunctivae, EOMI, PERRL  CV: RRR, nl s1/s2.  Resp: CTAB, normal rate and effort  GI: Abdomen soft, NT, ND. No rebound, no guarding  : No CVAT  Neuro: A&O x 3,  sensorimotor intact without deficits   MSK: No spine or joint tenderness to palpation, Full ROM ext x 4  Skin: No rashes. intact and perfused.

## 2024-12-19 LAB
C TRACH RRNA SPEC QL NAA+PROBE: NOT DETECTED
CULTURE RESULTS: SIGNIFICANT CHANGE UP
N GONORRHOEA RRNA SPEC QL NAA+PROBE: NOT DETECTED
SOURCE AMPLIFICATION: NORMAL
SPECIMEN SOURCE: SIGNIFICANT CHANGE UP

## 2024-12-31 ENCOUNTER — APPOINTMENT (OUTPATIENT)
Dept: UROLOGY | Facility: CLINIC | Age: 29
End: 2024-12-31
Payer: MEDICAID

## 2024-12-31 VITALS
HEART RATE: 78 BPM | SYSTOLIC BLOOD PRESSURE: 103 MMHG | HEIGHT: 69 IN | WEIGHT: 175 LBS | DIASTOLIC BLOOD PRESSURE: 65 MMHG | BODY MASS INDEX: 25.92 KG/M2

## 2024-12-31 DIAGNOSIS — R39.89 OTHER SYMPTOMS AND SIGNS INVOLVING THE GENITOURINARY SYSTEM: ICD-10-CM

## 2024-12-31 PROCEDURE — 99212 OFFICE O/P EST SF 10 MIN: CPT

## 2025-04-16 ENCOUNTER — APPOINTMENT (OUTPATIENT)
Dept: UROLOGY | Facility: CLINIC | Age: 30
End: 2025-04-16

## 2025-04-16 VITALS
HEART RATE: 62 BPM | BODY MASS INDEX: 25.92 KG/M2 | DIASTOLIC BLOOD PRESSURE: 70 MMHG | HEIGHT: 69 IN | WEIGHT: 175 LBS | SYSTOLIC BLOOD PRESSURE: 121 MMHG

## 2025-04-23 ENCOUNTER — APPOINTMENT (OUTPATIENT)
Dept: UROLOGY | Facility: CLINIC | Age: 30
End: 2025-04-23

## 2025-05-01 ENCOUNTER — OFFICE (OUTPATIENT)
Dept: URBAN - METROPOLITAN AREA CLINIC 112 | Facility: CLINIC | Age: 30
Setting detail: OPHTHALMOLOGY
End: 2025-05-01
Payer: COMMERCIAL

## 2025-05-01 DIAGNOSIS — H16.223: ICD-10-CM

## 2025-05-01 DIAGNOSIS — H47.213: ICD-10-CM

## 2025-05-01 DIAGNOSIS — H52.211: ICD-10-CM

## 2025-05-01 PROCEDURE — 92020 GONIOSCOPY: CPT | Performed by: OPHTHALMOLOGY

## 2025-05-01 PROCEDURE — 92004 COMPRE OPH EXAM NEW PT 1/>: CPT | Performed by: OPHTHALMOLOGY

## 2025-05-01 PROCEDURE — 92083 EXTENDED VISUAL FIELD XM: CPT | Performed by: OPHTHALMOLOGY

## 2025-05-01 PROCEDURE — 92025 CPTRIZED CORNEAL TOPOGRAPHY: CPT | Performed by: OPHTHALMOLOGY

## 2025-05-01 PROCEDURE — 92250 FUNDUS PHOTOGRAPHY W/I&R: CPT | Performed by: OPHTHALMOLOGY

## 2025-05-01 ASSESSMENT — KERATOMETRY
OD_AXISANGLE_DEGREES: 100
OS_K2POWER_DIOPTERS: 41.25
OD_K2POWER_DIOPTERS: 42.75
OS_AXISANGLE_DEGREES: 097
OD_K1POWER_DIOPTERS: 39.00
METHOD_AUTO_MANUAL: AUTO
OS_K1POWER_DIOPTERS: 40.50

## 2025-05-01 ASSESSMENT — SUPERFICIAL PUNCTATE KERATITIS (SPK)
OD_SPK: T
OS_SPK: T

## 2025-05-01 ASSESSMENT — REFRACTION_MANIFEST
OD_CYLINDER: -3.75
OD_SPHERE: +2.50
OS_SPHERE: PLANO
OD_VA1: 20/20
OS_VA1: 20/20
OS_AXIS: 005
OD_AXIS: 015
OS_CYLINDER: -0.25

## 2025-05-01 ASSESSMENT — CONFRONTATIONAL VISUAL FIELD TEST (CVF)
OS_FINDINGS: FULL
OD_FINDINGS: FULL

## 2025-05-01 ASSESSMENT — REFRACTION_AUTOREFRACTION
OD_CYLINDER: -4.00
OS_CYLINDER: -0.50
OS_SPHERE: 0.00
OD_AXIS: 013
OS_AXIS: 005
OD_SPHERE: +2.50

## 2025-05-01 ASSESSMENT — REFRACTION_CURRENTRX
OS_VPRISM_DIRECTION: SV
OS_OVR_VA: 20/
OS_AXIS: 003
OD_CYLINDER: -1.50
OS_CYLINDER: -0.50
OD_VPRISM_DIRECTION: SV
OS_SPHERE: PLANO
OD_OVR_VA: 20/
OD_SPHERE: +1.50
OD_AXIS: 014

## 2025-05-01 ASSESSMENT — TONOMETRY
OD_IOP_MMHG: 18
OS_IOP_MMHG: 20

## 2025-05-01 ASSESSMENT — VISUAL ACUITY
OD_BCVA: 20/20-1
OS_BCVA: 20/60

## 2025-05-07 ENCOUNTER — NON-APPOINTMENT (OUTPATIENT)
Age: 30
End: 2025-05-07

## 2025-05-07 ENCOUNTER — APPOINTMENT (OUTPATIENT)
Dept: UROLOGY | Facility: CLINIC | Age: 30
End: 2025-05-07

## 2025-05-07 VITALS
WEIGHT: 170 LBS | SYSTOLIC BLOOD PRESSURE: 100 MMHG | DIASTOLIC BLOOD PRESSURE: 62 MMHG | HEIGHT: 69 IN | HEART RATE: 91 BPM | BODY MASS INDEX: 25.18 KG/M2

## 2025-05-07 DIAGNOSIS — N46.9 MALE INFERTILITY, UNSPECIFIED: ICD-10-CM

## 2025-05-07 LAB
BILIRUB UR QL STRIP: NORMAL
CLARITY UR: CLEAR
COLLECTION METHOD: NORMAL
GLUCOSE UR-MCNC: NORMAL
HCG UR QL: 0.2 EU/DL
HGB UR QL STRIP.AUTO: NORMAL
KETONES UR-MCNC: NORMAL
LEUKOCYTE ESTERASE UR QL STRIP: NORMAL
NITRITE UR QL STRIP: NORMAL
PH UR STRIP: 5.5
PROT UR STRIP-MCNC: NORMAL
SP GR UR STRIP: 1.02

## 2025-05-07 PROCEDURE — 99203 OFFICE O/P NEW LOW 30 MIN: CPT | Mod: 25

## 2025-05-14 ENCOUNTER — OFFICE (OUTPATIENT)
Dept: URBAN - METROPOLITAN AREA CLINIC 94 | Facility: CLINIC | Age: 30
Setting detail: OPHTHALMOLOGY
End: 2025-05-14
Payer: COMMERCIAL

## 2025-05-14 ENCOUNTER — RX ONLY (RX ONLY)
Age: 30
End: 2025-05-14

## 2025-05-14 DIAGNOSIS — H16.223: ICD-10-CM

## 2025-05-14 PROBLEM — H52.01 HYPERMETROPIA; RIGHT EYE: Status: ACTIVE | Noted: 2025-05-14

## 2025-05-14 PROBLEM — H52.211 IRREGULAR ASTIGMATISM; RIGHT EYE: Status: ACTIVE | Noted: 2025-05-01

## 2025-05-14 PROCEDURE — 99212 OFFICE O/P EST SF 10 MIN: CPT | Performed by: OPTOMETRIST

## 2025-05-14 ASSESSMENT — REFRACTION_MANIFEST
OS_CYLINDER: -0.25
OS_VA1: 20/20
OD_SPHERE: +1.50
OD_VA1: 20/40-
OS_SPHERE: PLANO
OD_VA1: 20/20
OD_AXIS: 015
OS_SPHERE: PLANO
OS_VA1: 20/20
OD_SPHERE: +2.50
OS_CYLINDER: -0.50
OD_CYLINDER: -3.75
OD_AXIS: 015
OS_SPHERE: PLANO
OD_CYLINDER: -2.50
OD_AXIS: 015
OS_AXIS: 020
OS_AXIS: 020
OS_AXIS: 005
OS_CYLINDER: -0.50
OS_VA1: 20/20
OD_SPHERE: +2.50
OD_VA1: 20/25
OD_CYLINDER: -3.75

## 2025-05-14 ASSESSMENT — SUPERFICIAL PUNCTATE KERATITIS (SPK)
OS_SPK: T
OD_SPK: T

## 2025-05-14 ASSESSMENT — REFRACTION_CURRENTRX
OS_AXIS: 003
OD_CYLINDER: -1.50
OS_CYLINDER: -0.50
OS_SPHERE: PLANO
OS_VPRISM_DIRECTION: SV
OD_SPHERE: +1.50
OD_AXIS: 014
OS_OVR_VA: 20/
OD_OVR_VA: 20/
OD_VPRISM_DIRECTION: SV

## 2025-05-14 ASSESSMENT — REFRACTION_AUTOREFRACTION
OS_AXIS: 023
OD_AXIS: 013
OD_SPHERE: +2.75
OS_SPHERE: PL
OD_CYLINDER: -4.25
OS_CYLINDER: -0.25

## 2025-05-14 ASSESSMENT — CONFRONTATIONAL VISUAL FIELD TEST (CVF)
OD_FINDINGS: FULL
OS_FINDINGS: FULL

## 2025-05-14 ASSESSMENT — TONOMETRY
OS_IOP_MMHG: 13
OD_IOP_MMHG: 14

## 2025-05-14 ASSESSMENT — VISUAL ACUITY
OS_BCVA: 20/200
OD_BCVA: 20/20

## 2025-05-14 ASSESSMENT — KERATOMETRY
OD_AXISANGLE_DEGREES: 100
OS_K1POWER_DIOPTERS: 40.50
OD_K2POWER_DIOPTERS: 43.00
OS_AXISANGLE_DEGREES: 100
OD_K1POWER_DIOPTERS: 38.75
OS_K2POWER_DIOPTERS: 41.25
METHOD_AUTO_MANUAL: AUTO

## 2025-06-04 ASSESSMENT — REFRACTION_MANIFEST
OS_CYLINDER: -0.50
OD_CYLINDER: -3.75
OD_AXIS: 015
OD_AXIS: 015
OS_AXIS: 020
OS_AXIS: 020
OS_CYLINDER: -0.50
OD_VA1: 20/20
OS_VA1: 20/20
OD_SPHERE: +2.50
OD_VA1: 20/40-
OS_AXIS: 005
OD_CYLINDER: -2.50
OS_SPHERE: PLANO
OS_SPHERE: PLANO
OD_AXIS: 015
OD_SPHERE: +1.50
OS_VA1: 20/20
OS_SPHERE: PLANO
OD_CYLINDER: -3.75
OD_SPHERE: +2.50
OS_CYLINDER: -0.25
OS_VA1: 20/20
OD_VA1: 20/25

## 2025-06-04 ASSESSMENT — KERATOMETRY
METHOD_AUTO_MANUAL: AUTO
OS_K1POWER_DIOPTERS: 40.50
OS_K2POWER_DIOPTERS: 41.25
OD_K2POWER_DIOPTERS: 43.00
OD_K1POWER_DIOPTERS: 38.75
OD_AXISANGLE_DEGREES: 100
OS_AXISANGLE_DEGREES: 100

## 2025-06-04 ASSESSMENT — REFRACTION_CURRENTRX
OS_CYLINDER: -0.50
OD_OVR_VA: 20/
OD_CYLINDER: -1.50
OS_OVR_VA: 20/
OD_AXIS: 014
OS_SPHERE: PLANO
OD_SPHERE: +1.50
OS_AXIS: 003
OS_VPRISM_DIRECTION: SV
OD_VPRISM_DIRECTION: SV

## 2025-08-06 ENCOUNTER — APPOINTMENT (OUTPATIENT)
Dept: UROLOGY | Facility: CLINIC | Age: 30
End: 2025-08-06